# Patient Record
Sex: MALE | Race: WHITE | NOT HISPANIC OR LATINO | URBAN - METROPOLITAN AREA
[De-identification: names, ages, dates, MRNs, and addresses within clinical notes are randomized per-mention and may not be internally consistent; named-entity substitution may affect disease eponyms.]

---

## 2020-06-01 ENCOUNTER — AMBULATORY - HEALTHEAST (OUTPATIENT)
Dept: SURGERY | Facility: CLINIC | Age: 65
End: 2020-06-01

## 2020-06-01 DIAGNOSIS — Z11.59 ENCOUNTER FOR SCREENING FOR OTHER VIRAL DISEASES: ICD-10-CM

## 2020-07-01 ASSESSMENT — MIFFLIN-ST. JEOR: SCORE: 1857.2

## 2020-07-02 ENCOUNTER — AMBULATORY - HEALTHEAST (OUTPATIENT)
Dept: MULTI SPECIALTY CLINIC | Facility: CLINIC | Age: 65
End: 2020-07-02

## 2020-07-02 LAB
CREAT SERPL-MCNC: 1.06 MG/DL (ref 0.73–1.18)
GFR ESTIMATE EXT - HISTORICAL: >60 ML/MIN/1.73M2

## 2020-07-02 ASSESSMENT — MIFFLIN-ST. JEOR: SCORE: 1857.2

## 2020-07-05 ENCOUNTER — AMBULATORY - HEALTHEAST (OUTPATIENT)
Dept: FAMILY MEDICINE | Facility: CLINIC | Age: 65
End: 2020-07-05

## 2020-07-05 DIAGNOSIS — Z11.59 ENCOUNTER FOR SCREENING FOR OTHER VIRAL DISEASES: ICD-10-CM

## 2020-07-08 ENCOUNTER — ANESTHESIA - HEALTHEAST (OUTPATIENT)
Dept: SURGERY | Facility: CLINIC | Age: 65
End: 2020-07-08

## 2020-07-08 ENCOUNTER — SURGERY - HEALTHEAST (OUTPATIENT)
Dept: SURGERY | Facility: CLINIC | Age: 65
End: 2020-07-08

## 2020-07-08 ASSESSMENT — MIFFLIN-ST. JEOR: SCORE: 1854.03

## 2021-06-04 VITALS — HEIGHT: 70 IN | WEIGHT: 234.3 LBS | BODY MASS INDEX: 33.54 KG/M2

## 2021-06-09 NOTE — ANESTHESIA PROCEDURE NOTES
Spinal Block    Patient location during procedure: OR  Start time: 7/8/2020 12:05 PM  End time: 7/8/2020 12:15 PM  Reason for block: primary anesthetic      Preanesthetic Checklist  Completed: patient identified, risks, benefits, and alternatives discussed, timeout performed, consent obtained, airway assessed, oxygen available, suction available, emergency drugs available and hand hygiene performed  Spinal Block  Patient position: sitting  Prep: ChloraPrep and site prepped and draped  Patient monitoring: heart rate, continuous pulse ox, cardiac monitor and blood pressure    Assessment  Events: failed spinal    Additional Notes:  Multiple attempts at L3/4 and L2/3 with multiple needles.  Unable to obtain CSF.

## 2021-06-09 NOTE — ANESTHESIA CARE TRANSFER NOTE
Last vitals:   Vitals:    07/08/20 1319   BP: 161/78   Pulse: 82   Resp: 16   Temp: 36.7  C (98.1  F)   SpO2:      Patient's level of consciousness is drowsy  Spontaneous respirations: yes  Maintains airway independently: yes  Dentition unchanged: yes  Oropharynx: oropharynx clear of all foreign objects    QCDR Measures:  ASA# 20 - Surgical Safety Checklist: WHO surgical safety checklist completed prior to induction    PQRS# 430 - Adult PONV Prevention: 4558F - Pt received => 2 anti-emetic agents (different classes) preop & intraop  ASA# 8 - Peds PONV Prevention: NA - Not pediatric patient, not GA or 2 or more risk factors NOT present  PQRS# 424 - Rachana-op Temp Management: 4559F - At least one body temp DOCUMENTED => 35.5C or 95.9F within required timeframe  PQRS# 426 - PACU Transfer Protocol: - Transfer of care checklist used  ASA# 14 - Acute Post-op Pain: ASA14B - Patient did NOT experience pain >= 7 out of 10   Patient states he has pain, dilaudid and fentanyl given upon wake up.  O2 sats 99%

## 2021-06-09 NOTE — ANESTHESIA PREPROCEDURE EVALUATION
Anesthesia Evaluation      No history of anesthetic complications     Airway   Mallampati: II  Neck ROM: full   Pulmonary - normal exam   (+) asthma   well controlled, sleep apnea on CPAP, ,                          Cardiovascular - normal exam  (+) hypertension, ,     Rhythm: regular  Rate: normal,         Neuro/Psych - negative ROS     Endo/Other    (+) obesity,      GI/Hepatic/Renal - negative ROS           Dental    (+) poor dentition    Comment: Denies loose teeth                       Anesthesia Plan  Planned anesthetic: spinal  Magnesium and ketamine for opioid sparing effect  ASA 2     Anesthetic plan and risks discussed with: patient

## 2021-06-09 NOTE — ANESTHESIA POSTPROCEDURE EVALUATION
Patient: Gerhard Aguirre  Procedure(s):  LEFT TOTAL HIP ARTHROPLASTY (Left)  Anesthesia type: general    Patient location: PACU  Last vitals:   Vitals Value Taken Time   /64 7/8/2020  2:30 PM   Temp 36.8  C (98.3  F) 7/8/2020  2:30 PM   Pulse 75 7/8/2020  2:34 PM   Resp 14 7/8/2020  2:34 PM   SpO2 100 % 7/8/2020  2:34 PM   Vitals shown include unvalidated device data.  Post vital signs: stable  Level of consciousness: awake and responds to simple questions  Post-anesthesia pain: pain controlled  Post-anesthesia nausea and vomiting: no  Pulmonary: unassisted, return to baseline  Cardiovascular: stable and blood pressure at baseline  Hydration: adequate  Anesthetic events: no    QCDR Measures:  ASA# 11 - Rachana-op Cardiac Arrest: ASA11B - Patient did NOT experience unanticipated cardiac arrest  ASA# 12 - Rachana-op Mortality Rate: ASA12B - Patient did NOT die  ASA# 13 - PACU Re-Intubation Rate: ASA13B - Patient did NOT require a new airway mgmt  ASA# 10 - Composite Anes Safety: ASA10A - No serious adverse event    Additional Notes: